# Patient Record
Sex: MALE | Race: WHITE
[De-identification: names, ages, dates, MRNs, and addresses within clinical notes are randomized per-mention and may not be internally consistent; named-entity substitution may affect disease eponyms.]

---

## 2021-10-03 ENCOUNTER — HOSPITAL ENCOUNTER (EMERGENCY)
Dept: HOSPITAL 61 - ER | Age: 21
Discharge: HOME | End: 2021-10-03
Payer: COMMERCIAL

## 2021-10-03 VITALS — WEIGHT: 189.6 LBS | BODY MASS INDEX: 26.54 KG/M2 | HEIGHT: 71 IN

## 2021-10-03 VITALS — DIASTOLIC BLOOD PRESSURE: 60 MMHG | SYSTOLIC BLOOD PRESSURE: 139 MMHG

## 2021-10-03 DIAGNOSIS — Z20.822: ICD-10-CM

## 2021-10-03 DIAGNOSIS — R11.2: Primary | ICD-10-CM

## 2021-10-03 DIAGNOSIS — R10.12: ICD-10-CM

## 2021-10-03 LAB
ALBUMIN SERPL-MCNC: 4.1 G/DL (ref 3.4–5)
ALBUMIN/GLOB SERPL: 1.3 {RATIO} (ref 1–1.7)
ALP SERPL-CCNC: 66 U/L (ref 46–116)
ALT SERPL-CCNC: 31 U/L (ref 16–63)
ANION GAP SERPL CALC-SCNC: 10 MMOL/L (ref 6–14)
APTT PPP: YELLOW S
AST SERPL-CCNC: 19 U/L (ref 15–37)
BACTERIA #/AREA URNS HPF: 0 /HPF
BASOPHILS # BLD AUTO: 0 X10^3/UL (ref 0–0.2)
BASOPHILS NFR BLD: 0 % (ref 0–3)
BILIRUB SERPL-MCNC: 0.5 MG/DL (ref 0.2–1)
BILIRUB UR QL STRIP: NEGATIVE
BUN SERPL-MCNC: 13 MG/DL (ref 8–26)
BUN/CREAT SERPL: 12 (ref 6–20)
CALCIUM SERPL-MCNC: 9.2 MG/DL (ref 8.5–10.1)
CHLORIDE SERPL-SCNC: 100 MMOL/L (ref 98–107)
CO2 SERPL-SCNC: 25 MMOL/L (ref 21–32)
CREAT SERPL-MCNC: 1.1 MG/DL (ref 0.7–1.3)
EOSINOPHIL NFR BLD: 0 X10^3/UL (ref 0–0.7)
EOSINOPHIL NFR BLD: 1 % (ref 0–3)
ERYTHROCYTE [DISTWIDTH] IN BLOOD BY AUTOMATED COUNT: 13.3 % (ref 11.5–14.5)
FIBRINOGEN PPP-MCNC: CLEAR MG/DL
GFR SERPLBLD BASED ON 1.73 SQ M-ARVRAT: 84.5 ML/MIN
GLUCOSE SERPL-MCNC: 98 MG/DL (ref 70–99)
HCT VFR BLD CALC: 42.1 % (ref 39–53)
HGB BLD-MCNC: 14.4 G/DL (ref 13–17.5)
LIPASE: 59 U/L (ref 73–393)
LYMPHOCYTES # BLD: 0.6 X10^3/UL (ref 1–4.8)
LYMPHOCYTES NFR BLD AUTO: 12 % (ref 24–48)
MCH RBC QN AUTO: 30 PG (ref 25–35)
MCHC RBC AUTO-ENTMCNC: 34 G/DL (ref 31–37)
MCV RBC AUTO: 89 FL (ref 79–100)
MONO #: 0.4 X10^3/UL (ref 0–1.1)
MONOCYTES NFR BLD: 7 % (ref 0–9)
NEUT #: 4.5 X10^3/UL (ref 1.8–7.7)
NEUTROPHILS NFR BLD AUTO: 81 % (ref 31–73)
NITRITE UR QL STRIP: NEGATIVE
PH UR STRIP: 8.5 [PH]
PLATELET # BLD AUTO: 225 X10^3/UL (ref 140–400)
POTASSIUM SERPL-SCNC: 3.7 MMOL/L (ref 3.5–5.1)
PROT SERPL-MCNC: 7.3 G/DL (ref 6.4–8.2)
PROT UR STRIP-MCNC: NEGATIVE MG/DL
RBC # BLD AUTO: 4.75 X10^6/UL (ref 4.3–5.7)
RBC #/AREA URNS HPF: 0 /HPF (ref 0–2)
SODIUM SERPL-SCNC: 135 MMOL/L (ref 136–145)
UROBILINOGEN UR-MCNC: 0.2 MG/DL
WBC # BLD AUTO: 5.5 X10^3/UL (ref 4–11)
WBC #/AREA URNS HPF: 0 /HPF (ref 0–4)

## 2021-10-03 PROCEDURE — 85025 COMPLETE CBC W/AUTO DIFF WBC: CPT

## 2021-10-03 PROCEDURE — 99284 EMERGENCY DEPT VISIT MOD MDM: CPT

## 2021-10-03 PROCEDURE — 80053 COMPREHEN METABOLIC PANEL: CPT

## 2021-10-03 PROCEDURE — 87426 SARSCOV CORONAVIRUS AG IA: CPT

## 2021-10-03 PROCEDURE — 96361 HYDRATE IV INFUSION ADD-ON: CPT

## 2021-10-03 PROCEDURE — 74177 CT ABD & PELVIS W/CONTRAST: CPT

## 2021-10-03 PROCEDURE — U0003 INFECTIOUS AGENT DETECTION BY NUCLEIC ACID (DNA OR RNA); SEVERE ACUTE RESPIRATORY SYNDROME CORONAVIRUS 2 (SARS-COV-2) (CORONAVIRUS DISEASE [COVID-19]), AMPLIFIED PROBE TECHNIQUE, MAKING USE OF HIGH THROUGHPUT TECHNOLOGIES AS DESCRIBED BY CMS-2020-01-R: HCPCS

## 2021-10-03 PROCEDURE — 71045 X-RAY EXAM CHEST 1 VIEW: CPT

## 2021-10-03 PROCEDURE — 83690 ASSAY OF LIPASE: CPT

## 2021-10-03 PROCEDURE — 96374 THER/PROPH/DIAG INJ IV PUSH: CPT

## 2021-10-03 PROCEDURE — 36415 COLL VENOUS BLD VENIPUNCTURE: CPT

## 2021-10-03 PROCEDURE — 81001 URINALYSIS AUTO W/SCOPE: CPT

## 2021-10-03 PROCEDURE — 96375 TX/PRO/DX INJ NEW DRUG ADDON: CPT

## 2021-10-03 NOTE — RAD
Study: XR CHEST 1V



Indication: Abdominal pain. Vomiting.



Comparison: None.



Findings:



The cardiomediastinal silhouette and maurisio are within normal limits. No localized airspace opacity, pl
eural effusion or pneumothorax.



Impression:



No acute radiographic abnormality of the chest.



Electronically signed by: MADYSON WELCH MD (10/3/2021 10:45 AM) [facility]

## 2021-10-03 NOTE — PHYS DOC
Past Medical History


Past Medical History:  No Pertinent History


Past Surgical History:  Tonsillectomy, Other


Additional Past Surgical Histo:  Adnoids.


Smoking Status:  Never Smoker


Alcohol Use:  None


Drug Use:  None





General Adult


EDM:


Chief Complaint:  NAUSEA/VOMITING/DIARRHEA





HPI:


HPI:





Patient is a 20  year old male who presents with fat he ate some really greasy 

food and has been having nausea, vomiting and left upper quadrant cramping type 

pain.  He states in the past he has been told that his gallbladder was inflamed.

 Other history he has had his tonsillectomy.  He states that he took one of his 

sisters Zofran pills this morning but vomited up.  He states he then tried some 

nausea but then vomited it up.  States he has not been drinking any alcohol.  

Rates his pain and discomfort at a 6/10.





Review of Systems:


Review of Systems:


Constitutional:   Denies fever or chills. []


Eyes:   Denies change in visual acuity. []


HENT:   Denies nasal congestion or sore throat. [] 


Respiratory:   Denies cough or shortness of breath. [] 


Cardiovascular:   Denies chest pain or edema. [] 


GI:  +abdominal pain, +nausea, +vomiting, denies bloody stools or diarrhea. [] 


:  Denies dysuria. [] 


Musculoskeletal:   Denies back pain or joint pain. [] 


Integument:   Denies rash. [] 


Neurologic:   Denies headache, focal weakness or sensory changes. [] 


Endocrine:   Denies polyuria or polydipsia. [] 


Lymphatic:  Denies swollen glands. [] 


Psychiatric:  Denies depression or anxiety. []





Heart Score:


C/O Chest Pain:  No





Current Medications:





Current Medications








 Medications


  (Trade)  Dose


 Ordered  Sig/Johnathon  Start Time


 Stop Time Status Last Admin


Dose Admin


 


 Famotidine


  (Pepcid Vial)  20 mg  1X  ONCE  10/3/21 10:30


 10/3/21 10:36 DC  





 


 Prochlorperazine


 Edisylate


  (Compazine)  10 mg  1X  ONCE  10/3/21 10:30


 10/3/21 10:36 DC  





 


 Sodium Chloride  1,000 ml @ 


 1,000 mls/hr  Q1H  10/3/21 10:30


 10/3/21 11:29   














Allergies:


Allergies:





Allergies








Coded Allergies Type Severity Reaction Last Updated Verified


 


  No Known Drug Allergies    11/26/15 No











Physical Exam:


PE:





Constitutional: Well developed, well nourished, no acute distress, non-toxic 

appearance. []


HENT: Normocephalic, atraumatic, bilateral external ears normal, oropharynx 

moist, no oral exudates, nose normal. []


Eyes: PERRLA, EOMI, conjunctiva normal, no discharge. [] 


Neck: Normal range of motion, no tenderness, supple, no stridor. [] 


Cardiovascular:Heart rate regular rhythm, no murmur []


Lungs & Thorax:  Bilateral breath sounds clear to auscultation []


Abdomen: Bowel sounds normal, soft, left upper quadrant tenderness, no masses, 

no pulsatile masses. [] 


Skin: Warm, dry, no erythema, no rash. [] 


Back: No tenderness, no CVA tenderness. [] 


Extremities: No tenderness, no cyanosis, no clubbing, ROM intact, no edema. [] 


Neurologic: Alert and oriented X 3, normal motor function, normal sensory 

function, no focal deficits noted. []


Psychologic: Affect normal, judgement normal, mood normal. []





EKG:


EKG:


[]





Radiology/Procedures:


Radiology/Procedures:


[]


Impression:


                            [facility]


                    [address] [place] [address]


                                 [phone]


                                        


                                 IMAGING REPORT





                                     Signed





PATIENT: TAINA FRANKLIN     ACCOUNT: [id number]     MRN#: [id number]


: 2000           LOCATION: ER              AGE: 20


SEX: M                    EXAM DT: 10/03/21         ACCESSION#: [id number]


STATUS: REG ER            ORD. PHYSICIAN: DACIA JOSEPH


REASON: abd pain, vomiting


PROCEDURE: CT ABD PELV W/ IV CONTRST ONLY








INDICATION: Reason: abd pain, vomiting / Spl. Instructions: IV omni 300 75 mls /

 History: 





COMPARISON: None.





TECHNIQUE: 





Axial CT images were obtained through the abdomen and pelvis with intravenous 

contrast.  





One or more of the following individualized dose reduction techniques were 

utilized for this examination:  1. Automated exposure control;  2. Adjustment of

 the mA and/or kV according to patient size;  3. Use of iterative reconstruction

 technique.





FINDINGS:








Vascular: No abdominal aortic aneurysm.


Hepatobiliary: Low density focus adjacent to the falciform ligament commonly 

from focal fat.


Pancreas: No peripancreatic edema.


Spleen: Spleen unremarkable.


Renal/Bladder: No hydronephrosis. Bladder partially distended.


Gastrointestinal: The suspected appendix measures approximately 6 mm which is 

near upper limits of normal in size without adjacent inflammatory changes.


Small fat-containing umbilical hernia.


There are some scattered prominent lymph nodes including within the right side 

of the mesenteric fat measuring up to about 9 mm short axis.


There are some calcifications within the soft tissue near the right hamstrings 

insertion which could be from some heterotopic ossifications within the region 

or chronic calcified tendinosis with another possible cause including remote 

avulsion. Degenerative changes of the hips with acetabulum having a somewhat 

flattened appearance superiorly. Likely congenital. Femoral acetabular 

impingement not excluded.





IMPRESSION:





*   The appendix measures near the upper limits of normal in size with no 

adjacent inflammatory changes therefore this does not fulfill all of the CT 

criteria for appendicitis





*  No evidence of bowel obstruction or hydronephrosis.





Electronically signed by: Tonie Jeffrey MD (10/3/2021 11:42 AM) [email or web address]

[id number]














DICTATED and SIGNED BY:     TONIE JEFFREY MD


DATE:     10/03/21 [id number] 0





                            [facility]


                    [address] [place] [address]


                                 [phone]


                                        


                                 IMAGING REPORT





                                     Signed





PATIENT: TAINA FRANKLIN     ACCOUNT: [id number]     MRN#: [id number]


: 2000           LOCATION: ER              AGE: 20


SEX: M                    EXAM DT: 10/03/21         ACCESSION#: [id number]


STATUS: REG ER            ORD. PHYSICIAN: DACIA JOSEPH


REASON: abdominal pain, vomiting


PROCEDURE: PORTABLE CHEST 1V





Study: XR CHEST 1V





Indication: Abdominal pain. Vomiting.





Comparison: None.





Findings:





The cardiomediastinal silhouette and maurisio are within normal limits. No localized

 airspace opacity, pleural effusion or pneumothorax.





Impression:





No acute radiographic abnormality of the chest.





Electronically signed by: MADYSON WELCH MD (10/3/2021 10:45 AM) [facility]














DICTATED and SIGNED BY:     MADYSON WELCH MD


DATE:     10/03/21 [id number] 0





Course & Med Decision Making:


Course & Med Decision Making


Pertinent Labs and Imaging studies reviewed. (See chart for details)





See HPI.  Alert and oriented x4.  Ambulatory with steady gait.  Speaks in full 

clear sentences.  Abdomen is soft with slight left upper quadrant tenderness 

with palpation.  Skin pink warm and dry.  Vital signs within normal limits.  He 

is given Pepcid, Compazine, and a 1 L normal saline bolus in the ED.





Blood work unremarkable.  Rapid Covid is negative.  Patient is receiving 2 L of 

normal saline.  He received Compazine.  CT is showing a appendix that is to the 

upper limits of normal but there is no inflammation or fluid so it is unlikely 

that it is appendicitis.  Patient is not tender in the right quadrants at all.  

He is only slightly tender in the epigastric and left upper quadrant.  We will 

challenge successfully.





[]





Dragon Disclaimer:


Dragon Disclaimer:


This electronic medical record was generated, in whole or in part, using a voice

 recognition dictation system.





Departure


Departure


Impression:  


   Primary Impression:  


   Nausea & vomiting


   Qualified Codes:  R11.2 - Nausea with vomiting, unspecified


   Additional Impression:  


   Abnormal finding on radiology exam


Disposition:   HOME / SELF CARE / HOMELESS


Condition:  STABLE


Referrals:  


WILBERTO RIVERA MD (PCP)


Patient Instructions:  Nausea and Vomiting





Additional Instructions:  


Drink plenty of water.  Take medication as prescribed.  Try not to eat a large 

amount of food today or any alcohol.  Try to stay away from fried foods or spicy

 foods.  If you cannot keep down any fluid or start running a fever or start 

having right lower quadrant pain return to the emergency room.


Scripts


Famotidine (PEPCID) 20 Mg Tablet


20 MG PO BID, #20 TAB


   Prov: DACIA JOSEPH APRN         10/3/21 


Ondansetron (ONDANSETRON ODT) 4 Mg Tab.rapdis


1 TAB PO PRN Q6-8HRS, #20 TAB


   Prov: DACIA JOSEPH APRN         10/3/21











DACIA JOSEPH APRN             Oct 3, 2021 10:48

## 2021-10-03 NOTE — RAD
INDICATION: Reason: abd pain, vomiting / Spl. Instructions: IV omni 300 75 mls / History: 



COMPARISON: None.



TECHNIQUE: 



Axial CT images were obtained through the abdomen and pelvis with intravenous contrast.  



One or more of the following individualized dose reduction techniques were utilized for this examinat
ion:  1. Automated exposure control;  2. Adjustment of the mA and/or kV according to patient size;  3
. Use of iterative reconstruction technique.



FINDINGS:





Vascular: No abdominal aortic aneurysm.

Hepatobiliary: Low density focus adjacent to the falciform ligament commonly from focal fat.

Pancreas: No peripancreatic edema.

Spleen: Spleen unremarkable.

Renal/Bladder: No hydronephrosis. Bladder partially distended.

Gastrointestinal: The suspected appendix measures approximately 6 mm which is near upper limits of no
rmal in size without adjacent inflammatory changes.

Small fat-containing umbilical hernia.

There are some scattered prominent lymph nodes including within the right side of the mesenteric fat 
measuring up to about 9 mm short axis.

There are some calcifications within the soft tissue near the right hamstrings insertion which could 
be from some heterotopic ossifications within the region or chronic calcified tendinosis with another
 possible cause including remote avulsion. Degenerative changes of the hips with acetabulum having a 
somewhat flattened appearance superiorly. Likely congenital. Femoral acetabular impingement not exclu
ded.



IMPRESSION:



*   The appendix measures near the upper limits of normal in size with no adjacent inflammatory meek
es therefore this does not fulfill all of the CT criteria for appendicitis



*  No evidence of bowel obstruction or hydronephrosis.



Electronically signed by: Jr Duron MD (10/3/2021 11:42 AM) [id number]

## 2021-10-04 NOTE — NUR
IP:  Attempted to notify patient of negative COVID19 test result.  Voicemail message to 
please return call at the number provided.

## 2021-10-05 NOTE — NUR
IP:  Patient returned phone call and was notified of negative COVID19 test result.  
Verbalized understanding.

## 2021-10-05 NOTE — NUR
IP:  Second attempt to notify patient of negative COVID19 test result.  Voicemail message 
left to please return call.